# Patient Record
Sex: MALE | Race: WHITE | Employment: FULL TIME | URBAN - METROPOLITAN AREA
[De-identification: names, ages, dates, MRNs, and addresses within clinical notes are randomized per-mention and may not be internally consistent; named-entity substitution may affect disease eponyms.]

---

## 2024-08-30 ENCOUNTER — OFFICE VISIT (OUTPATIENT)
Dept: ORTHOPEDIC SURGERY | Age: 24
End: 2024-08-30
Payer: COMMERCIAL

## 2024-08-30 DIAGNOSIS — S82.851A CLOSED TRIMALLEOLAR FRACTURE OF RIGHT ANKLE, INITIAL ENCOUNTER: Primary | ICD-10-CM

## 2024-08-30 PROCEDURE — 99205 OFFICE O/P NEW HI 60 MIN: CPT | Performed by: ORTHOPAEDIC SURGERY

## 2024-08-30 RX ORDER — POLYETHYLENE GLYCOL 3350 17 G/17G
POWDER, FOR SOLUTION ORAL
COMMUNITY
Start: 2024-08-28

## 2024-08-30 RX ORDER — ASPIRIN 81 MG/1
81 TABLET, CHEWABLE ORAL 2 TIMES DAILY
COMMUNITY
Start: 2024-08-28 | End: 2024-09-27

## 2024-08-30 RX ORDER — FLUOXETINE 40 MG/1
CAPSULE ORAL
COMMUNITY

## 2024-08-30 RX ORDER — OXYCODONE HYDROCHLORIDE 5 MG/1
5-10 TABLET ORAL EVERY 6 HOURS PRN
COMMUNITY
Start: 2024-08-28 | End: 2024-08-30

## 2024-08-30 RX ORDER — HYDROMORPHONE HYDROCHLORIDE 2 MG/1
2 TABLET ORAL EVERY 12 HOURS PRN
Qty: 20 TABLET | Refills: 0 | Status: SHIPPED | OUTPATIENT
Start: 2024-08-30 | End: 2024-09-15

## 2024-08-30 RX ORDER — ONDANSETRON 4 MG/1
4 TABLET, ORALLY DISINTEGRATING ORAL 3 TIMES DAILY PRN
COMMUNITY
Start: 2024-08-28 | End: 2024-09-27

## 2024-08-30 NOTE — PERIOP NOTE
Patient verified name and .  Order for consent NOT found in EHR at time of PAT visit. Unable to verify case posting against order; surgery verified by patient.    Type 1B surgery, PAT phone assessment complete.  Orders noyt received.  Labs per surgeon: unknown; no orders received    Labs per anesthesia protocol: none indicated    Patient answered medical/surgical history questions at their best of ability. All prior to admission medications documented in EPIC.    Patient instructed to continue taking all prescription medications up to the day of surgery but to take only the following medications the day of surgery according to anesthesia guidelines with a small sip of water: aspirin 81 mg, Prozac, Dilaudid (if needed), Zofran (if needed).  Also, patient is requested to take 2 Tylenol in the morning and then again before bed on the day before surgery. Regular or extra strength may be used.       Patient informed that all vitamins and supplements should be held 7 days prior to surgery and NSAIDS 5 days prior to surgery. Prescription meds to hold:vitamins and supplements    Patient instructed on the following:    > Arrive at [facility]PC Entrance, time of arrival to be called the day before by 1700  > NPO after midnight, unless otherwise indicated, including gum, mints, and ice chips  > Responsible adult must drive patient to the hospital, stay during surgery, and patient will need supervision 24 hours after anesthesia  > Use non moisturizing soap in shower the night before surgery and on the morning of surgery  > All piercings must be removed prior to arrival.    > Leave all valuables (money and jewelry) at home but bring insurance card and ID on DOS.   > You may be required to pay a deductible or co-pay on the day of your procedure. You can pre-pay by calling [phone] if your surgery is at the [facility] or [phone] if your surgery is at the [facility].  > Do not wear make-up, nail polish, lotions, cologne,

## 2024-08-30 NOTE — PROGRESS NOTES
Name: Aristides Ortega  YOB: 2000  Gender: male  MRN: [id number]    Summary:     Right trimalleolar ankle fracture dislocation     CC: New Patient (Right ankle fx xray in pacs)       HPI: Aristides Ortega is a 24 y.o. male who presents with New Patient (Right ankle fx xray in pacs)  .  This patient presents to the office today with a history of right trimalleolar ankle fracture dislocation.  Seen down in [place] in the emergency room and had a closed reduction performed and placed to a splint.  He is in today for orthopedic follow-up.    History was obtained by Patient and his wife    ROS/Meds/PSH/PMH/FH/SH: I personally reviewed the patients standard intake form.  Below are the pertinents    Tobacco:  has no history on file for tobacco use.  Diabetes: None      Physical Examination:    Exam of the right lower extremity shows appropriate swelling.  He is got no knee pain in this area.  His skin envelope is amenable to fixation.  He has brisk Apley refill in his toes.    Imaging:   I independently interpreted XR ordered by a different physician, taken from an outside facility of the right ankle which shows a Roy C ankle fracture dislocation           LUX LEYVA III, MD           Assessment:   Right trimalleolar ankle fracture dislocation    Treatment Plan:   5 This is an illness/condition that threatens bodily function  Treatment at this time: Urgent Major Surgery/procedure - this is a time sensitive condition that if left untreated will lead to significant morbidity.  Studies ordered: NO XR needed @ Next Visit    Weight-bearing status: NWB        Return to work/work restrictions: none  Prescription for Dilaudid given today    right  Open reduction and internal fixation ankle with possible syndesmotic ORIF and ankle arthroscopy    Outpatient - 1 hour, c-arm, Arthrex plates and 4.0 screws    Anesthesia - Choice     The patient understands the diagnosis, conservative and surgical treatment.  R/C and

## 2024-08-30 NOTE — PERIOP NOTE
Preop department called to notify patient of arrival time for scheduled procedure. Instructions given to   - Arrive at OPC Entrance [address]  - Remain NPO after midnight, unless otherwise indicated, including gum, mints, and ice chips.   - Have a responsible adult to drive patient to the hospital, stay during surgery, and patient will need supervision 24 hours after anesthesia.   - Use antibacterial soap in shower the night before surgery and on the morning of surgery.       Was patient contacted: yes-pt   Voicemail left:   Numbers contacted: [phone]   Arrival time: 1230

## 2024-09-02 ENCOUNTER — ANESTHESIA EVENT (OUTPATIENT)
Dept: SURGERY | Age: 24
End: 2024-09-02
Payer: COMMERCIAL

## 2024-09-02 RX ORDER — SODIUM CHLORIDE 9 MG/ML
INJECTION, SOLUTION INTRAVENOUS PRN
Status: CANCELLED | OUTPATIENT
Start: 2024-09-02

## 2024-09-03 ENCOUNTER — ANESTHESIA (OUTPATIENT)
Dept: SURGERY | Age: 24
End: 2024-09-03
Payer: COMMERCIAL

## 2024-09-03 ENCOUNTER — APPOINTMENT (OUTPATIENT)
Dept: GENERAL RADIOLOGY | Age: 24
End: 2024-09-03
Attending: ORTHOPAEDIC SURGERY
Payer: COMMERCIAL

## 2024-09-03 ENCOUNTER — TELEPHONE (OUTPATIENT)
Dept: ORTHOPEDIC SURGERY | Age: 24
End: 2024-09-03

## 2024-09-03 ENCOUNTER — HOSPITAL ENCOUNTER (OUTPATIENT)
Age: 24
Setting detail: OUTPATIENT SURGERY
Discharge: HOME | End: 2024-09-03
Attending: ORTHOPAEDIC SURGERY | Admitting: ORTHOPAEDIC SURGERY
Payer: COMMERCIAL

## 2024-09-03 VITALS
DIASTOLIC BLOOD PRESSURE: 64 MMHG | SYSTOLIC BLOOD PRESSURE: 125 MMHG | OXYGEN SATURATION: 95 % | BODY MASS INDEX: 35.31 KG/M2 | TEMPERATURE: 98.8 F | RESPIRATION RATE: 25 BRPM | WEIGHT: 290 LBS | HEART RATE: 87 BPM | HEIGHT: 76 IN

## 2024-09-03 DIAGNOSIS — S82.851A CLOSED TRIMALLEOLAR FRACTURE OF RIGHT ANKLE, INITIAL ENCOUNTER: Primary | ICD-10-CM

## 2024-09-03 PROCEDURE — 7100000010 HC PHASE II RECOVERY - FIRST 15 MIN: Performed by: ORTHOPAEDIC SURGERY

## 2024-09-03 PROCEDURE — 2580000003 HC RX 258: Performed by: SURGERY

## 2024-09-03 PROCEDURE — 6360000002 HC RX W HCPCS: Performed by: ORTHOPAEDIC SURGERY

## 2024-09-03 PROCEDURE — 3600000014 HC SURGERY LEVEL 4 ADDTL 15MIN: Performed by: ORTHOPAEDIC SURGERY

## 2024-09-03 PROCEDURE — 2500000003 HC RX 250 WO HCPCS: Performed by: REGISTERED NURSE

## 2024-09-03 PROCEDURE — 7100000000 HC PACU RECOVERY - FIRST 15 MIN: Performed by: ORTHOPAEDIC SURGERY

## 2024-09-03 PROCEDURE — C1713 ANCHOR/SCREW BN/BN,TIS/BN: HCPCS | Performed by: ORTHOPAEDIC SURGERY

## 2024-09-03 PROCEDURE — 2709999900 HC NON-CHARGEABLE SUPPLY: Performed by: ORTHOPAEDIC SURGERY

## 2024-09-03 PROCEDURE — 3600000004 HC SURGERY LEVEL 4 BASE: Performed by: ORTHOPAEDIC SURGERY

## 2024-09-03 PROCEDURE — 64447 NJX AA&/STRD FEMORAL NRV IMG: CPT | Performed by: SURGERY

## 2024-09-03 PROCEDURE — 3700000000 HC ANESTHESIA ATTENDED CARE: Performed by: ORTHOPAEDIC SURGERY

## 2024-09-03 PROCEDURE — 7100000001 HC PACU RECOVERY - ADDTL 15 MIN: Performed by: ORTHOPAEDIC SURGERY

## 2024-09-03 PROCEDURE — 64445 NJX AA&/STRD SCIATIC NRV IMG: CPT | Performed by: SURGERY

## 2024-09-03 PROCEDURE — 6360000002 HC RX W HCPCS: Performed by: SURGERY

## 2024-09-03 PROCEDURE — 3700000001 HC ADD 15 MINUTES (ANESTHESIA): Performed by: ORTHOPAEDIC SURGERY

## 2024-09-03 PROCEDURE — 6360000002 HC RX W HCPCS: Performed by: REGISTERED NURSE

## 2024-09-03 DEVICE — PLATE BONE 8 H TI LCK 3RD TBLR: Type: IMPLANTABLE DEVICE | Site: ANKLE | Status: FUNCTIONAL

## 2024-09-03 DEVICE — SCREW CANC 4X16: Type: IMPLANTABLE DEVICE | Site: ANKLE | Status: FUNCTIONAL

## 2024-09-03 DEVICE — SCREW BNE L16MM DIA3.5MM ANK TI LO PROF: Type: IMPLANTABLE DEVICE | Site: ANKLE | Status: FUNCTIONAL

## 2024-09-03 DEVICE — SCREW CANCELLOUS 4X14: Type: IMPLANTABLE DEVICE | Site: ANKLE | Status: FUNCTIONAL

## 2024-09-03 DEVICE — SCREW BNE L18MM DIA3.5MM TI LO PROF FOR ANK FRAC SET: Type: IMPLANTABLE DEVICE | Site: ANKLE | Status: FUNCTIONAL

## 2024-09-03 DEVICE — K-LESS T-ROPE W/DRV, SYN REPR, TI
Type: IMPLANTABLE DEVICE | Site: ANKLE | Status: FUNCTIONAL
Brand: ARTHREX®

## 2024-09-03 DEVICE — SCREW BNE L14MM DIA3.5MM CORT TI ST NONLOCKING HD LO PROF: Type: IMPLANTABLE DEVICE | Site: ANKLE | Status: FUNCTIONAL

## 2024-09-03 RX ORDER — MIDAZOLAM HYDROCHLORIDE 2 MG/2ML
2 INJECTION, SOLUTION INTRAMUSCULAR; INTRAVENOUS
Status: COMPLETED | OUTPATIENT
Start: 2024-09-03 | End: 2024-09-03

## 2024-09-03 RX ORDER — LIDOCAINE HYDROCHLORIDE 20 MG/ML
INJECTION, SOLUTION EPIDURAL; INFILTRATION; INTRACAUDAL; PERINEURAL PRN
Status: DISCONTINUED | OUTPATIENT
Start: 2024-09-03 | End: 2024-09-03 | Stop reason: SDUPTHER

## 2024-09-03 RX ORDER — ASPIRIN 81 MG/1
81 TABLET ORAL 2 TIMES DAILY
Qty: 90 TABLET | Refills: 0 | Status: SHIPPED | OUTPATIENT
Start: 2024-09-03

## 2024-09-03 RX ORDER — KETAMINE HYDROCHLORIDE 50 MG/ML
INJECTION, SOLUTION INTRAMUSCULAR; INTRAVENOUS PRN
Status: DISCONTINUED | OUTPATIENT
Start: 2024-09-03 | End: 2024-09-03 | Stop reason: SDUPTHER

## 2024-09-03 RX ORDER — PROPOFOL 10 MG/ML
INJECTION, EMULSION INTRAVENOUS PRN
Status: DISCONTINUED | OUTPATIENT
Start: 2024-09-03 | End: 2024-09-03 | Stop reason: SDUPTHER

## 2024-09-03 RX ORDER — FENTANYL CITRATE 50 UG/ML
100 INJECTION, SOLUTION INTRAMUSCULAR; INTRAVENOUS
Status: COMPLETED | OUTPATIENT
Start: 2024-09-03 | End: 2024-09-03

## 2024-09-03 RX ORDER — LABETALOL HYDROCHLORIDE 5 MG/ML
10 INJECTION, SOLUTION INTRAVENOUS
Status: DISCONTINUED | OUTPATIENT
Start: 2024-09-03 | End: 2024-09-03 | Stop reason: HOSPADM

## 2024-09-03 RX ORDER — NALOXONE HYDROCHLORIDE 0.4 MG/ML
INJECTION, SOLUTION INTRAMUSCULAR; INTRAVENOUS; SUBCUTANEOUS PRN
Status: DISCONTINUED | OUTPATIENT
Start: 2024-09-03 | End: 2024-09-03 | Stop reason: HOSPADM

## 2024-09-03 RX ORDER — HYDROMORPHONE HYDROCHLORIDE 2 MG/1
2 TABLET ORAL EVERY 12 HOURS PRN
Qty: 20 TABLET | Refills: 0 | Status: SHIPPED | OUTPATIENT
Start: 2024-09-03 | End: 2024-09-10

## 2024-09-03 RX ORDER — ROPIVACAINE HYDROCHLORIDE 5 MG/ML
INJECTION, SOLUTION EPIDURAL; INFILTRATION; PERINEURAL
Status: DISCONTINUED | OUTPATIENT
Start: 2024-09-03 | End: 2024-09-03 | Stop reason: SDUPTHER

## 2024-09-03 RX ORDER — LIDOCAINE HYDROCHLORIDE 10 MG/ML
1 INJECTION, SOLUTION INFILTRATION; PERINEURAL
Status: DISCONTINUED | OUTPATIENT
Start: 2024-09-03 | End: 2024-09-03 | Stop reason: HOSPADM

## 2024-09-03 RX ORDER — SODIUM CHLORIDE 0.9 % (FLUSH) 0.9 %
5-40 SYRINGE (ML) INJECTION EVERY 12 HOURS SCHEDULED
Status: DISCONTINUED | OUTPATIENT
Start: 2024-09-03 | End: 2024-09-03 | Stop reason: HOSPADM

## 2024-09-03 RX ORDER — CEPHALEXIN 500 MG/1
500 CAPSULE ORAL 4 TIMES DAILY
Qty: 12 CAPSULE | Refills: 0 | Status: SHIPPED | OUTPATIENT
Start: 2024-09-03

## 2024-09-03 RX ORDER — SODIUM CHLORIDE, SODIUM LACTATE, POTASSIUM CHLORIDE, CALCIUM CHLORIDE 600; 310; 30; 20 MG/100ML; MG/100ML; MG/100ML; MG/100ML
INJECTION, SOLUTION INTRAVENOUS CONTINUOUS
Status: DISCONTINUED | OUTPATIENT
Start: 2024-09-03 | End: 2024-09-03 | Stop reason: HOSPADM

## 2024-09-03 RX ORDER — HYDROMORPHONE HYDROCHLORIDE 2 MG/ML
0.5 INJECTION, SOLUTION INTRAMUSCULAR; INTRAVENOUS; SUBCUTANEOUS EVERY 5 MIN PRN
Status: DISCONTINUED | OUTPATIENT
Start: 2024-09-03 | End: 2024-09-03 | Stop reason: HOSPADM

## 2024-09-03 RX ORDER — DEXMEDETOMIDINE HYDROCHLORIDE 100 UG/ML
INJECTION, SOLUTION INTRAVENOUS PRN
Status: DISCONTINUED | OUTPATIENT
Start: 2024-09-03 | End: 2024-09-03 | Stop reason: SDUPTHER

## 2024-09-03 RX ORDER — PROCHLORPERAZINE EDISYLATE 5 MG/ML
5 INJECTION INTRAMUSCULAR; INTRAVENOUS
Status: DISCONTINUED | OUTPATIENT
Start: 2024-09-03 | End: 2024-09-03 | Stop reason: HOSPADM

## 2024-09-03 RX ORDER — OXYCODONE HYDROCHLORIDE 5 MG/1
5 TABLET ORAL
Status: DISCONTINUED | OUTPATIENT
Start: 2024-09-03 | End: 2024-09-03 | Stop reason: HOSPADM

## 2024-09-03 RX ORDER — IPRATROPIUM BROMIDE AND ALBUTEROL SULFATE 2.5; .5 MG/3ML; MG/3ML
1 SOLUTION RESPIRATORY (INHALATION)
Status: DISCONTINUED | OUTPATIENT
Start: 2024-09-03 | End: 2024-09-03 | Stop reason: HOSPADM

## 2024-09-03 RX ORDER — SODIUM CHLORIDE 0.9 % (FLUSH) 0.9 %
5-40 SYRINGE (ML) INJECTION PRN
Status: DISCONTINUED | OUTPATIENT
Start: 2024-09-03 | End: 2024-09-03 | Stop reason: HOSPADM

## 2024-09-03 RX ADMIN — DEXMEDETOMIDINE 4 MCG: 100 INJECTION, SOLUTION, CONCENTRATE INTRAVENOUS at 14:54

## 2024-09-03 RX ADMIN — ROPIVACAINE HYDROCHLORIDE 10 ML: 5 INJECTION, SOLUTION EPIDURAL; INFILTRATION; PERINEURAL at 13:32

## 2024-09-03 RX ADMIN — DEXMEDETOMIDINE 8 MCG: 100 INJECTION, SOLUTION, CONCENTRATE INTRAVENOUS at 13:59

## 2024-09-03 RX ADMIN — PROPOFOL 140 MCG/KG/MIN: 10 INJECTION, EMULSION INTRAVENOUS at 14:02

## 2024-09-03 RX ADMIN — FENTANYL CITRATE 100 MCG: 50 INJECTION INTRAMUSCULAR; INTRAVENOUS at 13:24

## 2024-09-03 RX ADMIN — KETAMINE HYDROCHLORIDE 20 MG: 50 INJECTION, SOLUTION INTRAMUSCULAR; INTRAVENOUS at 14:05

## 2024-09-03 RX ADMIN — PROPOFOL 50 MG: 10 INJECTION, EMULSION INTRAVENOUS at 14:01

## 2024-09-03 RX ADMIN — SODIUM CHLORIDE, POTASSIUM CHLORIDE, SODIUM LACTATE AND CALCIUM CHLORIDE: 600; 310; 30; 20 INJECTION, SOLUTION INTRAVENOUS at 13:26

## 2024-09-03 RX ADMIN — LIDOCAINE HYDROCHLORIDE 40 MG: 20 INJECTION, SOLUTION EPIDURAL; INFILTRATION; INTRACAUDAL; PERINEURAL at 14:01

## 2024-09-03 RX ADMIN — ROPIVACAINE HYDROCHLORIDE 20 ML: 5 INJECTION, SOLUTION EPIDURAL; INFILTRATION; PERINEURAL at 13:30

## 2024-09-03 RX ADMIN — MIDAZOLAM 2 MG: 1 INJECTION INTRAMUSCULAR; INTRAVENOUS at 13:24

## 2024-09-03 RX ADMIN — DEXMEDETOMIDINE 8 MCG: 100 INJECTION, SOLUTION, CONCENTRATE INTRAVENOUS at 14:10

## 2024-09-03 RX ADMIN — Medication 3000 MG: at 14:10

## 2024-09-03 NOTE — H&P
Outpatient Surgery History and Physical:  Aristides Ortega was seen and examined.    CHIEF COMPLAINT:   right ankle.     PE:   BP (!) 142/73   Pulse (!) 116   Temp 98.7 °F (37.1 °C) (Oral)   Resp 18   Ht 1.93 m (6' 4\")   Wt 131.5 kg (290 lb)   SpO2 95%   BMI 35.30 kg/m²     Heart:   Regular rhythm      Lungs:  Are clear      Past Medical History:    Past Medical History:   Diagnosis Date    Ankle fracture     Enlarged liver     Leukocytosis        Surgical History:   Past Surgical History:   Procedure Laterality Date    COLONOSCOPY         Social History: Patient  reports that he has never smoked. He has never used smokeless tobacco. He reports that he does not currently use alcohol. He reports that he does not use drugs.    Family History: History reviewed. No pertinent family history.    Allergies: Reviewed per EMR  Patient has no known allergies.    Medications:    Prior to Admission medications    Medication Sig Start Date End Date Taking? Authorizing Provider   aspirin 81 MG chewable tablet Take 1 tablet by mouth 2 times daily 8/28/24 9/27/24  Joy Miller MD   FLUoxetine (PROZAC) 40 MG capsule 0    Joy Miller MD   ondansetron (ZOFRAN-ODT) 4 MG disintegrating tablet Take 1 tablet by mouth 3 times daily as needed 8/28/24 9/27/24  Joy Miller MD   polyethylene glycol (GLYCOLAX) 17 GM/SCOOP powder  8/28/24   Joy Miller MD   Ascorbic Acid (VITAMIN C ER PO) 0    Joy Miller MD   naloxone 4 MG/0.1ML LIQD nasal spray 1 spray once as needed 8/28/24 9/27/24  Joy Miller MD   Cyanocobalamin (VITAMIN B 12) 100 MCG LOZG 1 gummy daily    Joy Miller MD   Probiotic Product (PROBIOTIC GUMMIES PO) 0    Joy Miller MD   Polyethylene Glycol 3350 (MIRALAX PO) Take 17 g by mouth daily 8/28/24 9/27/24  Joy Miller MD   Zolpidem Tartrate (AMBIEN PO) nightly    Joy Miller MD   HYDROmorphone (DILAUDID) 2 MG tablet Take 1

## 2024-09-03 NOTE — ANESTHESIA PROCEDURE NOTES
Peripheral Block    Patient location during procedure: pre-op  Reason for block: post-op pain management and at surgeon's request  Start time: 9/3/2024 1:31 PM  End time: 9/3/2024 1:32 PM  Staffing  Performed: anesthesiologist   Anesthesiologist: Vazquez Coates MD  Performed by: Vazquez Coates MD  Authorized by: Vazquez Coates MD    Preanesthetic Checklist  Completed: patient identified, IV checked, site marked, risks and benefits discussed, surgical/procedural consents, equipment checked, pre-op evaluation, timeout performed, anesthesia consent given, oxygen available and monitors applied/VS acknowledged  Peripheral Block   Patient position: supine  Prep: ChloraPrep  Provider prep: mask  Patient monitoring: cardiac monitor, continuous pulse ox, frequent blood pressure checks, IV access and oxygen  Block type: Femoral  Adductor canal  Laterality: right  Injection technique: single-shot  Guidance: ultrasound guided  Local infiltration: ropivacaine  Local infiltration: ropivacaine    Needle   Needle type: insulated echogenic nerve stimulator needle   Needle gauge: 21 G  Needle localization: ultrasound guidance  Needle length: 10 cm  Assessment   Injection assessment: negative aspiration for heme, no paresthesia on injection, local visualized surrounding nerve on ultrasound and no intravascular symptoms  Paresthesia pain: none  Slow fractionated injection: yes  Hemodynamics: stable  Outcomes: patient tolerated procedure well and uncomplicated    Additional Notes  Prior to procedure, risks/benefits/alternatives were discussed including: bleeding infection nerve injury damage to surrounding structures adverse reaction to medication / local anesthetic system toxicity risk of injury to numb extremity increased risk of falls    -Block placed for post op pain at surgeon's request.     -Ultrasound used to identify anatomy of nerve bundle.    -Needle placement and local injection at perineural area confirmed with

## 2024-09-03 NOTE — ANESTHESIA PRE PROCEDURE
Department of Anesthesiology  Preprocedure Note       Name:  Aristides Ortega   Age:  24 y.o.  :  2000                                          MRN:  [id number]         Date:  9/3/2024      Surgeon: Surgeon(s):  Cesar Chance III, MD    Procedure: Procedure(s):  Right Open reduction and internal fixation ankle with possible syndesmotic ORIF  Right Liagment reconsruction  Right  ankle arthroscopy    Medications prior to admission:   Prior to Admission medications    Medication Sig Start Date End Date Taking? Authorizing Provider   aspirin 81 MG chewable tablet Take 1 tablet by mouth 2 times daily 24  Joy Miller MD   FLUoxetine (PROZAC) 40 MG capsule 0    Joy Miller MD   ondansetron (ZOFRAN-ODT) 4 MG disintegrating tablet Take 1 tablet by mouth 3 times daily as needed 24  Joy Miller MD   polyethylene glycol (GLYCOLAX) 17 GM/SCOOP powder  24   Joy Miller MD   Ascorbic Acid (VITAMIN C ER PO) 0    Joy Miller MD   naloxone 4 MG/0.1ML LIQD nasal spray 1 spray once as needed 24  Joy Miller MD   Cyanocobalamin (VITAMIN B 12) 100 MCG LOZG 1 gummy daily    Joy Miller MD   Probiotic Product (PROBIOTIC GUMMIES PO) 0    Joy Miller MD   Polyethylene Glycol 3350 (MIRALAX PO) Take 17 g by mouth daily 24  Joy Miller MD   Zolpidem Tartrate (AMBIEN PO) nightly    Joy Miller MD   HYDROmorphone (DILAUDID) 2 MG tablet Take 1 tablet by mouth every 12 hours as needed for Pain for up to 20 doses. Max Daily Amount: 4 mg 8/30/24 9/15/24  Cesar Chance III, MD       Current medications:    Current Facility-Administered Medications   Medication Dose Route Frequency Provider Last Rate Last Admin    lidocaine 1 % injection 1 mL  1 mL IntraDERmal Once PRN Vazquez Coates MD        lactated ringers IV soln infusion   IntraVENous Continuous Vazquez Coates

## 2024-09-03 NOTE — TELEPHONE ENCOUNTER
Spoke with patient, his medication was sent to [facility] on [address]. He will  medication from that location. I will call patient back after speaking to JWSAFIA about home health care.

## 2024-09-03 NOTE — DISCHARGE INSTRUCTIONS
POSTOPERATIVE SURGICAL INSTRUCTIONS/ INFORMATION:    Your narcotic (pain medication) and an antibiotic will be sent to the pharmacy listed in your chart.  Both of these medications should be taken as directed on the bottle.      If the surgery you had requires you to be nonweightbearing, in addition to the narcotic and antibiotic you will also have an aspirin prescription that should also be taken as directed on the bottle.  This will be taken until you are told to discontinue it.  If you run out of the prescription of aspirin and over-the-counter 81 mg aspirin will work as well.    Nonweightbearing to the affected extremity means you are not allowed to put pressure or any weight on the surgical extremity.  Information regarding scooters, crutches and other assistive devices will have been discussed at your presurgical office visit these devices are to be used until told otherwise by the doctor or nurse practitioner.    Pain can be hard to manage postoperatively especially if you had an anesthetic nerve block and do not know when it will wear off.  It is recommended that you start taking pain medication even with an anesthetic block the night of surgery.  The anesthetic nerve block can last up to 72 hours postoperatively, your leg will likely be numb as long as the anesthetic block is working.      If you are taking the pain medication as directed on the bottle and your pain is not managed or controlled you may double the medication, taking 2 tablets every 4-6 hours as needed for 24 hours.  Once pain level is controlled you will resume the recommended dosage on the bottle.    Pain medication may cause constipation, to help minimize this ensure you are drinking plenty of water after surgery.  You may start a stool softener and/or MiraLAX to help alleviate or mitigate this problem.  Please take these over-the-counter products as directed on the bottle.    Please make every effort to leave your postoperative dressing 
Airway patent, dentition appears intact, mild ttp left lateral mandible, no facial crepitus

## 2024-09-03 NOTE — TELEPHONE ENCOUNTER
He needs to get a refill of Dilaudid sent to the pharmacy on file. He will also need home health set up.

## 2024-09-03 NOTE — OP NOTE
trimalleolar equivalent ankle fracture.    Operation In Detail:  Patient was evaluated in the preoperative area.  We had a long discussion about the procedure and postoperative protocols.  The patient was then brought back to the operating room suite and placed in the operating room table.  A timeout was taken to identify the patient, procedure being performed, and laterality.  After this the patient was prepped and draped in the normal sterile fashion using a Betadine solution and/or a ChloraPrep solution.  A timeout was then taken to identify the patient his name, date of birth, laterality, and procedure being performed.  We also identified allergies and any concerns about the operation.  Attention was then placed to the operative extremity.  A block was placed by the department of anesthesia.  In a preop surgical timeout the right lower extremity was identified as a correct surgical site and prepped and draped in a standard sterile fashions and ChloraPrep solution.  Standard anteromedial and anterolateral arthroscopic portals were established using nick and spread technique.  Arthroscopy exam of the ankle was performed.  There was significant tears of the lateral ankle ligament complex which were debrided using an oscillating shaver as well as part of the deltoid ligament which had flipped into the medial joint which was also debrided using the shaver.  No evidence of full-thickness osteochondral defect was identified.  There was a large loose body off the distal fibula with an intra-articular component which was excised at that time.  The arthroscopy equipment was removed and both portals were closed using nylon sutures.  A lateral approach the distal fibula was and opened at that time.  The superficial peroneal nerve was identified and carefully protected.  The underlying fracture site was reduced in anatomic alignment.  This was secured using a lag screw.  A one third semitubular plate was then affixed

## 2024-09-03 NOTE — ANESTHESIA POSTPROCEDURE EVALUATION
Department of Anesthesiology  Postprocedure Note    Patient: Aristides Ortega  MRN: [id number]  YOB: 2000  Date of evaluation: 9/3/2024    Procedure Summary       Date: 09/03/24 Room / Location: [facility] OP OR 02 / [facility]    Anesthesia Start: 1358 Anesthesia Stop: 1515    Procedures:       Right Open reduction and internal fixation ankle with possible syndesmotic ORIF (Right: Ankle)      Right Liagment reconsruction (Right: Ankle)      Right  ankle arthroscopy (Right: Ankle) Diagnosis:       Closed trimalleolar fracture of right ankle      (Closed trimalleolar fracture of right ankle [S82.851A])    Surgeons: Cesar Chance III, MD Responsible Provider: Vazquez Coates MD    Anesthesia Type: TIVA ASA Status: 1            Anesthesia Type: TIVA    Davion Phase I: Davion Score: 10    Davion Phase II: Davion Score: 10    Anesthesia Post Evaluation    Patient location during evaluation: PACU  Patient participation: complete - patient participated  Level of consciousness: awake and alert  Airway patency: patent  Nausea & Vomiting: no nausea and no vomiting  Cardiovascular status: hemodynamically stable  Respiratory status: acceptable, nonlabored ventilation and spontaneous ventilation  Hydration status: euvolemic  Comments: /64   Pulse 87   Temp 98.8 °F (37.1 °C) (Temporal)   Resp 25   Ht 1.93 m (6' 4\")   Wt 131.5 kg (290 lb)   SpO2 95%   BMI 35.30 kg/m²     Multimodal analgesia pain management approach  Pain management: adequate and satisfactory to patient    There were no known notable events for this encounter.

## 2024-09-03 NOTE — ANESTHESIA PROCEDURE NOTES
Peripheral Block    Patient location during procedure: pre-op  Reason for block: post-op pain management and at surgeon's request  Start time: 9/3/2024 1:24 PM  End time: 9/3/2024 1:30 PM  Staffing  Performed: anesthesiologist   Anesthesiologist: Vazquez Coates MD  Performed by: Vazquez Coates MD  Authorized by: Vazquez Coates MD    Preanesthetic Checklist  Completed: patient identified, IV checked, site marked, risks and benefits discussed, surgical/procedural consents, equipment checked, pre-op evaluation, timeout performed, anesthesia consent given, oxygen available and monitors applied/VS acknowledged  Peripheral Block   Patient position: left lateral decubitus  Prep: ChloraPrep  Provider prep: mask  Patient monitoring: cardiac monitor, continuous pulse ox, frequent blood pressure checks, IV access and oxygen  Block type: Sciatic  Popliteal  Laterality: right  Injection technique: single-shot  Guidance: ultrasound guided  Local infiltration: ropivacaine  Local infiltration: ropivacaine    Needle   Needle type: insulated echogenic nerve stimulator needle   Needle gauge: 21 G  Needle localization: ultrasound guidance  Needle length: 10 cm  Assessment   Injection assessment: negative aspiration for heme, local visualized surrounding nerve on ultrasound, no intravascular symptoms and no paresthesia on injection  Paresthesia pain: immediately resolved (paresthesia during needle advancment, needle withdrawn. Paresthesia immediately resolved prior to proceeding with remainder of block)  Slow fractionated injection: yes  Hemodynamics: stable  Outcomes: patient tolerated procedure well and uncomplicated    Additional Notes  Prior to procedure, risks/benefits/alternatives were discussed including: bleeding infection nerve injury damage to surrounding structures adverse reaction to medication / local anesthetic system toxicity risk of injury to numb extremity increased risk of falls    -Block placed for post op

## 2024-09-04 DIAGNOSIS — S82.851A CLOSED TRIMALLEOLAR FRACTURE OF RIGHT ANKLE, INITIAL ENCOUNTER: Primary | ICD-10-CM

## 2024-09-04 DIAGNOSIS — G89.18 ACUTE POST-OPERATIVE PAIN: Primary | ICD-10-CM

## 2024-09-04 RX ORDER — HYDROMORPHONE HYDROCHLORIDE 2 MG/1
2 TABLET ORAL EVERY 4 HOURS PRN
Qty: 20 TABLET | Refills: 0 | Status: SHIPPED | OUTPATIENT
Start: 2024-09-04 | End: 2024-09-09

## 2024-09-04 NOTE — TELEPHONE ENCOUNTER
Spoke with patient, he will  his medication at [facility] pharmacy. He will let us know if the pharmacy gives them any problems filling the medication.

## 2024-09-04 NOTE — TELEPHONE ENCOUNTER
[facility] is telling him they don't have the Dilaudid that was sent. Please resend to the pharmacy

## 2024-09-04 NOTE — TELEPHONE ENCOUNTER
He is calling back. The pharmacy told them that [facility] had cancelled the RX for Dilaudid. He had surgery yesterday and has no pain meds. He is asking for a return call.

## 2024-09-05 ENCOUNTER — TELEPHONE (OUTPATIENT)
Dept: ANESTHESIOLOGY | Age: 24
End: 2024-09-05

## 2024-09-05 NOTE — TELEPHONE ENCOUNTER
Called Aristides Ortega at primary contact phone number on chart ([phone]). he reported peripheral nerve block had worn off. Denied any residual numbness, weakness, or paresthesias.      Vazquez Coates MD  [facility]

## 2024-09-18 ENCOUNTER — OFFICE VISIT (OUTPATIENT)
Dept: ORTHOPEDIC SURGERY | Age: 24
End: 2024-09-18
Payer: COMMERCIAL

## 2024-09-18 DIAGNOSIS — S82.851A CLOSED TRIMALLEOLAR FRACTURE OF RIGHT ANKLE, INITIAL ENCOUNTER: Primary | ICD-10-CM

## 2024-09-18 PROCEDURE — 99024 POSTOP FOLLOW-UP VISIT: CPT | Performed by: NURSE PRACTITIONER

## 2024-09-18 PROCEDURE — 29405 APPL SHORT LEG CAST: CPT | Performed by: NURSE PRACTITIONER

## 2024-09-30 ENCOUNTER — OFFICE VISIT (OUTPATIENT)
Dept: ORTHOPEDIC SURGERY | Age: 24
End: 2024-09-30
Payer: COMMERCIAL

## 2024-09-30 DIAGNOSIS — S82.851A CLOSED TRIMALLEOLAR FRACTURE OF RIGHT ANKLE, INITIAL ENCOUNTER: Primary | ICD-10-CM

## 2024-09-30 PROCEDURE — M5002 MISC BOOT SOCK: HCPCS | Performed by: ORTHOPAEDIC SURGERY

## 2024-09-30 PROCEDURE — 99024 POSTOP FOLLOW-UP VISIT: CPT | Performed by: ORTHOPAEDIC SURGERY

## 2024-09-30 PROCEDURE — M5017 MISC EVENUP: HCPCS | Performed by: ORTHOPAEDIC SURGERY

## 2024-09-30 PROCEDURE — L4361 PNEUMA/VAC WALK BOOT PRE OTS: HCPCS | Performed by: ORTHOPAEDIC SURGERY

## 2024-09-30 NOTE — PROGRESS NOTES
Name: Aristides Ortega  YOB: 2000  Gender: male  MRN: [id number]    Procedure Performed:Right Open reduction and internal fixation ankle with possible syndesmotic ORIF - Right, Right Liagment reconsruction - Right, and Right  ankle arthroscopy - Right        Date of Procedure: 9/3/2024      Subjective: Having some pain in his heel      Physical Examination: The cast was removed today.  All of his incisions are intact without sign of infection.  There is no calf tenderness or sign or symptom of DVT.  The heel is intact without evidence of ulceration or skin breakdown.  No sign of infection is noted.  He does have some stiffness and cannot bring his ankle in neutral position in dorsiflexion.        Imaging:   No imaging reviewed          Assessment:   Right ankle ORIF      Plan:   3 This is stable chronic illness/condition  Treatment at this time: Bracing: Placed in: 3D boot  Studies ordered: Ankle XR needed @ Next Visit    Weight-bearing status: NWB        Return to work/work restrictions: none  No medications given      He is placed into a walker boot today.  This will allow him to take the boot off and start open chain range of motion exercises with some concern about some of the stiffness that he has that he may have difficulty regaining his motion.  I have counseled him today to maintain strict nonweightbearing until his follow-up on October 9 and he understands this.  He will return October 9 for an x-ray of the ankle and begin progressive weightbearing in the walker boot.

## 2024-09-30 NOTE — PROGRESS NOTES
The patient was prescribed a walker boot for the patient's right foot. The patient wears a size 12.5 shoe and I fitted them with a L size boot. The patient was fitted and instructed on the use of prescribed walker boot by a Registered Orthopedic Technician. I explained how to fit themselves and that the plastic flexible piece should always be on the front of the boot and secured by the Velcro straps on top. The air bladder in the boot was adjusted according to proper fit and comfort. The patient walked a short distance and acknowledged satisfaction with current fit. I also explained that they need a heel lift or a higher heeled shoe for the uninvolved LE to help normalize gait and avoid excessive low back stress/strain due to leg length inequality created from walker boot.    The patient was also prescribed and given an even up shoe lift for the left side.     Patient read and signed documenting they understand and agree to [facility]'s current DME return policy.

## 2024-10-09 ENCOUNTER — OFFICE VISIT (OUTPATIENT)
Dept: ORTHOPEDIC SURGERY | Age: 24
End: 2024-10-09

## 2024-10-09 DIAGNOSIS — S82.851A CLOSED TRIMALLEOLAR FRACTURE OF RIGHT ANKLE, INITIAL ENCOUNTER: Primary | ICD-10-CM

## 2024-10-09 PROCEDURE — M5017 MISC EVENUP: HCPCS | Performed by: NURSE PRACTITIONER

## 2024-10-09 PROCEDURE — 99024 POSTOP FOLLOW-UP VISIT: CPT | Performed by: NURSE PRACTITIONER

## 2024-10-09 NOTE — PROGRESS NOTES
Name: Aristides Ortega  YOB: 2000  Gender: male  MRN: [id number]    Procedure Performed:  Right ankle arthroscopy with extensive debridement and removal of lateral ankle loose body  Open reduction internal fixation of right lateral malleolus fracture  Open reduction internal fixation of right syndesmosis disruption  Primary repair of right deltoid ligament disruption of the ankle      Date of Procedure: 09/03/2024        Subjective: Patient reports overall that he is doing okay.  His pain seems to be well-managed under control at today's visit.  He has returned to work on restrictions.      Physical Examination: The incisional areas are still continuing to heal well and do not show any signs of infection at this time.  Most of the Steri-Strips are still in place after cast removal today.  He has palpable pulses and intact sensation to the foot.  There is noted swelling to the affected extremity.  The bruising is healing.  He has normal capillary refill.  He has no signs of DVT at this time, denies of any calf or behind the knee pain and does present with a negative Homans' sign.        Imaging:   Interpretation of imaging  Right foot and ankle XR: AP, Lateral, Oblique views     ICD-10-CM    1. Closed trimalleolar fracture of right ankle, initial encounter  S82.851A XR FOOT RIGHT (MIN 3 VIEWS)     XR ANKLE RIGHT (MIN 3 VIEWS)         Report: AP, lateral, oblique x-ray of the right foot and ankle demonstrates healing fractures with no hardware failure    Impression: Healing fractures with no hardware failure   Keri Ritter, APRN - CNP           Assessment:   Status post right ankle arthroscopy with ankle ORIF and syndesmosis fixation and deltoid ligament repair.      Plan:   3 This is stable chronic illness/condition  Treatment at this time: Patient will continue to wear the cam walker boot as a matter medical necessity where he may now begin to bear weight as the patient can tolerate

## 2024-10-09 NOTE — PROGRESS NOTES
Patient was fitted and instructed on an Even Up for the left foot. Patient read and signed documenting they understand and agree to [facility]'s current DME return policy.

## 2024-10-16 DIAGNOSIS — G89.18 ACUTE POST-OPERATIVE PAIN: ICD-10-CM

## 2024-10-16 DIAGNOSIS — S82.851A CLOSED TRIMALLEOLAR FRACTURE OF RIGHT ANKLE, INITIAL ENCOUNTER: Primary | ICD-10-CM

## 2024-10-16 RX ORDER — SALICYLIC ACID 40 %
81 ADHESIVE PATCH, MEDICATED TOPICAL 2 TIMES DAILY
Qty: 90 TABLET | Refills: 0 | OUTPATIENT
Start: 2024-10-16

## 2024-11-01 ENCOUNTER — TELEPHONE (OUTPATIENT)
Dept: ORTHOPEDIC SURGERY | Age: 24
End: 2024-11-01

## 2024-11-01 NOTE — TELEPHONE ENCOUNTER
Spoke with patient. He was told he can drive if his foot allows him to do so. He would need to start off by driving in around his neighborhood or a parking lot to see how his foot will react. He also can not wear the walker boot while driving. He voiced understanding.

## 2024-11-15 ENCOUNTER — OFFICE VISIT (OUTPATIENT)
Dept: ORTHOPEDIC SURGERY | Age: 24
End: 2024-11-15

## 2024-11-15 DIAGNOSIS — S82.851A CLOSED TRIMALLEOLAR FRACTURE OF RIGHT ANKLE, INITIAL ENCOUNTER: Primary | ICD-10-CM

## 2024-11-15 PROCEDURE — 99024 POSTOP FOLLOW-UP VISIT: CPT | Performed by: NURSE PRACTITIONER

## 2024-11-15 NOTE — PROGRESS NOTES
Name: Aristides Ortega  YOB: 2000  Gender: male  MRN: [id number]    Procedure Performed:  Right ankle arthroscopy with extensive debridement and removal of lateral ankle loose body  Open reduction internal fixation of right lateral malleolus fracture  Open reduction internal fixation of right syndesmosis disruption  Primary repair of right deltoid ligament disruption of the ankle      Date of Procedure: 09/03/2024      Subjective: Patient reports that he is done really well, he is performing in physical therapy and feels that he is getting a strength back slowly.  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      Physical Examination: Incisional areas are all well-healed, there are no signs of infection to the foot or ankle today.  He wiggles all toes effectively without pain.  He is able to do a double and single-leg toe raise effectively however with some effort today.  There is some discomfort with a single-leg toe raise.  There is expected swelling to the affected extremity.  He has palpable pulses and intact sensation to the foot.  Range of motion movements are stiff however he is actively able to perform these today.        Imaging:   Interpretation of imaging  Right ankle XR: AP, Lateral, Oblique views     ICD-10-CM    1. Closed trimalleolar fracture of right ankle, initial encounter  S82.851A XR ANKLE RIGHT (MIN 3 VIEWS)         Report: AP, lateral, oblique x-ray of the right ankle demonstrates healed fracture without hardware failure    Impression: Healed fractures without hardware failure   Keri Ritter, APRN - CNP           Assessment:   Status post right ankle arthroscopy with ORIF syndesmosis fixation and deltoid ligament repair.      Plan:   3 This is stable chronic illness/condition  Treatment at this time: Patient may begin to wean from the walker boot back in comfortable shoes as she can

## 2024-12-12 ENCOUNTER — TELEPHONE (OUTPATIENT)
Dept: ORTHOPEDIC SURGERY | Age: 24
End: 2024-12-12

## 2024-12-12 NOTE — TELEPHONE ENCOUNTER
Move patients appt to [facility] at 3:20 pm At [address]. Told him to call if anything changes

## 2025-01-15 ENCOUNTER — OFFICE VISIT (OUTPATIENT)
Dept: ORTHOPEDIC SURGERY | Age: 25
End: 2025-01-15
Payer: COMMERCIAL

## 2025-01-15 DIAGNOSIS — S82.851A CLOSED TRIMALLEOLAR FRACTURE OF RIGHT ANKLE, INITIAL ENCOUNTER: Primary | ICD-10-CM

## 2025-01-15 PROCEDURE — 99213 OFFICE O/P EST LOW 20 MIN: CPT | Performed by: NURSE PRACTITIONER

## 2025-01-15 NOTE — PROGRESS NOTES
Name: Aristides Ortega  YOB: 2000  Gender: male  MRN: [id number]    Procedure Performed:  Right ankle arthroscopy with extensive debridement and removal of lateral ankle loose body  Open reduction internal fixation of right lateral malleolus fracture  Open reduction internal fixation of right syndesmosis disruption  Primary repair of right deltoid ligament disruption of the ankle      Date of Procedure: 09/03/2024      Subjective: Patient reports that overall he has done really well.  He is progressed in the gym and is doing a lot of higher impact activities.  He feels like he is ready to return to full duty at work.  He has no complaints or areas of pain he wishes to discuss today.      Physical Examination: All incisional areas look great and well-healed.  He is able to do double and single-leg toe raise effectively without pain.  Ankle joint stable to talar tilt and anterior drawer testing.  He has palpable pulses and intact sensation of foot.  Swelling seems to be lessening however still present throughout the ankle on both medial and lateral sides.        Imaging:   Interpretation of imaging  Right ankle XR: AP, Lateral, Oblique views     ICD-10-CM    1. Closed trimalleolar fracture of right ankle, initial encounter  S82.851A XR ANKLE RIGHT (MIN 3 VIEWS)         Report: AP, lateral, oblique x-ray of the right ankle demonstrates healed fractures without hardware failure    Impression: Healed fractures without hardware failure   Keri Ritter, APRN - CNP           Assessment:   Status post right ankle arthroscopy with ORIF and syndesmosis fixation with deltoid ligament repair.      Plan:   3 This is stable chronic illness/condition  Treatment at this time: Time with no intervention and ASO - lace up Ankle  Studies ordered: NO XR needed @ Next Visit    Weight-bearing status: WBAT        Return to work/work restrictions:  Patient will return to full duty and no restrictions starting

## (undated) DEVICE — GLOVE SURG SZ 8 L12IN FNGR THK79MIL GRN LTX FREE

## (undated) DEVICE — BIT DRL DIA3.5MM TRIM-IT

## (undated) DEVICE — BANDAGE COMPR W6INXL12FT SMOOTH FOR LIMB EXSANG ESMARCH

## (undated) DEVICE — SPLINT THMB W4XL30IN FBRGLS PD PRECUT LTWT DURABLE FAST SET

## (undated) DEVICE — SYRINGE MED 30ML STD CLR PLAS LUERLOCK TIP N CTRL DISP

## (undated) DEVICE — NEEDLE HYPO 18GA L1.5IN PNK S STL HUB POLYPR SHLD REG BVL

## (undated) DEVICE — TUBING PMP L16FT MAIN DISP FOR AR-6400 AR-6475 Â€“ ORDER MULTIPLES OF 10 EACH

## (undated) DEVICE — SOLUTION IRRIG 3000ML 0.9% SOD CHL USP UROMATIC PLAS CONT

## (undated) DEVICE — GLOVE SURG SZ 65 L12IN FNGR THK79MIL GRN LTX FREE

## (undated) DEVICE — SUTURE VICRYL SZ 2-0 L27IN ABSRB UD L26MM CT-2 1/2 CIR J269H

## (undated) DEVICE — BIT DRL DIA2.5MM FOR ANK FRAC MGMT SYS

## (undated) DEVICE — BANDAGE COMPR W4INXL12FT E DISP ESMARCH EVEN

## (undated) DEVICE — GLOVE SURG SZ 65 CRM LTX FREE POLYISOPRENE POLYMER BEAD ANTI

## (undated) DEVICE — SOLUTION IRRIG 1000ML 0.9% SOD CHL USP POUR PLAS BTL

## (undated) DEVICE — GOWN,SIRUS,NONRNF,SETINSLV,XL,20/CS: Brand: MEDLINE

## (undated) DEVICE — NEEDLE SPNL L3.5IN PNK HUB S STL REG WALL FIT STYL W/ QNCKE

## (undated) DEVICE — FOAM BUMP ROUND LARGE: Brand: MEDLINE INDUSTRIES, INC.

## (undated) DEVICE — FOOT DR TOLLISON & WOMACK: Brand: MEDLINE INDUSTRIES, INC.

## (undated) DEVICE — DRAPE C ARM W41XL74IN UNIV MOB W RUBBERBAND CLP

## (undated) DEVICE — DRAPE C ARM W54XL84IN MINI FOR OEC 6800

## (undated) DEVICE — SHEET,DRAPE,53X77,STERILE: Brand: MEDLINE

## (undated) DEVICE — DISSECTOR ENDOSCP L7MM DIA3MM FOR RESECT SM JT COOLCUT